# Patient Record
Sex: FEMALE | Race: WHITE | NOT HISPANIC OR LATINO | URBAN - METROPOLITAN AREA
[De-identification: names, ages, dates, MRNs, and addresses within clinical notes are randomized per-mention and may not be internally consistent; named-entity substitution may affect disease eponyms.]

---

## 2021-03-31 DIAGNOSIS — Z23 ENCOUNTER FOR IMMUNIZATION: ICD-10-CM

## 2021-04-06 ENCOUNTER — IMMUNIZATIONS (OUTPATIENT)
Dept: FAMILY MEDICINE CLINIC | Facility: HOSPITAL | Age: 61
End: 2021-04-06

## 2021-04-06 DIAGNOSIS — Z23 ENCOUNTER FOR IMMUNIZATION: Primary | ICD-10-CM

## 2021-04-06 PROCEDURE — 91300 SARS-COV-2 / COVID-19 MRNA VACCINE (PFIZER-BIONTECH) 30 MCG: CPT

## 2021-04-06 PROCEDURE — 0001A SARS-COV-2 / COVID-19 MRNA VACCINE (PFIZER-BIONTECH) 30 MCG: CPT

## 2021-04-27 ENCOUNTER — IMMUNIZATIONS (OUTPATIENT)
Dept: FAMILY MEDICINE CLINIC | Facility: HOSPITAL | Age: 61
End: 2021-04-27

## 2021-04-27 DIAGNOSIS — Z23 ENCOUNTER FOR IMMUNIZATION: Primary | ICD-10-CM

## 2021-04-27 PROCEDURE — 91300 SARS-COV-2 / COVID-19 MRNA VACCINE (PFIZER-BIONTECH) 30 MCG: CPT

## 2021-04-27 PROCEDURE — 0002A SARS-COV-2 / COVID-19 MRNA VACCINE (PFIZER-BIONTECH) 30 MCG: CPT

## 2021-12-11 ENCOUNTER — IMMUNIZATIONS (OUTPATIENT)
Dept: FAMILY MEDICINE CLINIC | Facility: HOSPITAL | Age: 61
End: 2021-12-11

## 2021-12-11 DIAGNOSIS — Z23 ENCOUNTER FOR IMMUNIZATION: Primary | ICD-10-CM

## 2021-12-11 PROCEDURE — 91300 COVID-19 PFIZER VACC 0.3 ML: CPT

## 2021-12-11 PROCEDURE — 0001A COVID-19 PFIZER VACC 0.3 ML: CPT

## 2022-04-29 ENCOUNTER — APPOINTMENT (OUTPATIENT)
Dept: LAB | Facility: MEDICAL CENTER | Age: 62
End: 2022-04-29

## 2023-02-25 ENCOUNTER — OFFICE VISIT (OUTPATIENT)
Dept: URGENT CARE | Age: 63
End: 2023-02-25

## 2023-02-25 VITALS
TEMPERATURE: 97 F | RESPIRATION RATE: 18 BRPM | SYSTOLIC BLOOD PRESSURE: 144 MMHG | OXYGEN SATURATION: 99 % | HEART RATE: 74 BPM | DIASTOLIC BLOOD PRESSURE: 76 MMHG

## 2023-02-25 DIAGNOSIS — H10.9 CONJUNCTIVITIS OF RIGHT EYE, UNSPECIFIED CONJUNCTIVITIS TYPE: Primary | ICD-10-CM

## 2023-02-25 RX ORDER — GENTAMICIN SULFATE 3 MG/ML
1 SOLUTION/ DROPS OPHTHALMIC 3 TIMES DAILY
Qty: 5 ML | Refills: 0 | Status: SHIPPED | OUTPATIENT
Start: 2023-02-25

## 2023-02-25 NOTE — PROGRESS NOTES
[facility]        NAME: Matheus Mena is a 58 y o  female  : 1960    MRN: [id number]  DATE: 2023  TIME: 10:48 AM    Assessment and Plan   Conjunctivitis of right eye, unspecified conjunctivitis type [H10 9]  1  Conjunctivitis of right eye, unspecified conjunctivitis type  gentamicin (GARAMYCIN) 0 3 % ophthalmic solution            Patient Instructions       Follow up with PCP in 3-5 days  Proceed to  ER if symptoms worsen  Chief Complaint     Chief Complaint   Patient presents with   • Eye Pain     Thursday a shoe box hit her right eye and she was having some discomfort- this am her eye was glued shut- vision right 20/25 left 20/20 with glasses         History of Present Illness       HPI   She reports she was opening a shoe box 2 days ago and it hit her in the right eye  She had some pain and watering  Discontinued yesterday  Today she woke up and it was glued shot  Also a bit of blurry vision    Review of Systems   Review of Systems   Constitutional: Negative for fever  Eyes: Positive for pain, discharge, redness and visual disturbance (blurry vision)  Gastrointestinal: Negative for diarrhea and vomiting  Neurological: Negative for headaches  Current Medications       Current Outpatient Medications:   •  gentamicin (GARAMYCIN) 0 3 % ophthalmic solution, Administer 1 drop to the right eye 3 (three) times a day X 5 days, Disp: 5 mL, Rfl: 0    Current Allergies     Allergies as of 2023 - Reviewed 2023   Allergen Reaction Noted   • Aspirin buf(eutan-khdpy-oxiaw) [ascriptin] Rash 2023   • Penicillins Rash 2012            The following portions of the patient's history were reviewed and updated as appropriate: allergies, current medications, past family history, past medical history, past social history, past surgical history and problem list      No past medical history on file  No past surgical history on file      No family history on file       Medications have been verified  Objective   /76 (BP Location: Right arm, Patient Position: Sitting, Cuff Size: Standard)   Pulse 74   Temp (!) 97 °F (36 1 °C)   Resp 18   SpO2 99%   No LMP recorded  Physical Exam     Physical Exam  HENT:      Right Ear: Tympanic membrane normal       Left Ear: Tympanic membrane normal       Nose: No rhinorrhea  Mouth/Throat:      Pharynx: Posterior oropharyngeal erythema (right eye) present  Eyes:      General:         Right eye: No discharge  Left eye: No discharge        Comments: Right conjunctival erythema, with mild swelling

## 2023-02-25 NOTE — LETTER
February 25, 2023     Patient: Jagruti Ha   YOB: 1960   Date of Visit: 2/25/2023       To Whom it May Concern:    Jud Max was seen in my clinic on 2/25/2023  She may return to work on 02/26/2023  If you have any questions or concerns, please don't hesitate to call           Sincerely,          APURVA Proctor        CC: No Recipients

## 2023-06-01 ENCOUNTER — OFFICE VISIT (OUTPATIENT)
Dept: FAMILY MEDICINE CLINIC | Facility: CLINIC | Age: 63
End: 2023-06-01

## 2023-06-01 VITALS
SYSTOLIC BLOOD PRESSURE: 110 MMHG | HEART RATE: 86 BPM | DIASTOLIC BLOOD PRESSURE: 70 MMHG | HEIGHT: 64 IN | OXYGEN SATURATION: 96 % | BODY MASS INDEX: 30.01 KG/M2 | TEMPERATURE: 97.5 F | WEIGHT: 175.8 LBS

## 2023-06-01 DIAGNOSIS — Z00.00 WELL ADULT EXAM: Primary | ICD-10-CM

## 2023-06-01 DIAGNOSIS — Z13.1 SCREENING FOR DIABETES MELLITUS: ICD-10-CM

## 2023-06-01 DIAGNOSIS — Z12.31 SCREENING MAMMOGRAM FOR BREAST CANCER: ICD-10-CM

## 2023-06-01 DIAGNOSIS — Z12.11 SCREEN FOR COLON CANCER: ICD-10-CM

## 2023-06-01 DIAGNOSIS — Z00.00 ANNUAL PHYSICAL EXAM: ICD-10-CM

## 2023-06-01 DIAGNOSIS — Z13.220 SCREENING FOR LIPID DISORDERS: ICD-10-CM

## 2023-06-01 DIAGNOSIS — Z12.31 ENCOUNTER FOR SCREENING MAMMOGRAM FOR MALIGNANT NEOPLASM OF BREAST: ICD-10-CM

## 2023-06-01 NOTE — PROGRESS NOTES
[facility]    NAME: Loly Peter  AGE: 61 y o  SEX: female  : 1960     DATE: 2023     Assessment and Plan:     Problem List Items Addressed This Visit    None  Visit Diagnoses     Well adult exam    -  Primary    Screening for lipid disorders        Screening for diabetes mellitus        Relevant Orders    Comprehensive metabolic panel    Encounter for screening mammogram for malignant neoplasm of breast        Screen for colon cancer        Relevant Orders    Cologuard    Screening mammogram for breast cancer        Relevant Orders    Mammo screening bilateral w 3d & cad    Annual physical exam              Immunizations and preventive care screenings were discussed with patient today  Appropriate education was printed on patient's after visit summary  Counseling:  · Exercise: the importance of regular exercise/physical activity was discussed  Recommend exercise 3-5 times per week for at least 30 minutes  BMI Counseling: Body mass index is 30 18 kg/m²  The BMI is above normal  Nutrition recommendations include decreasing portion sizes, encouraging healthy choices of fruits and vegetables, decreasing fast food intake, consuming healthier snacks, limiting drinks that contain sugar and moderation in carbohydrate intake  Exercise recommendations include exercising 3-5 times per week  Rationale for BMI follow-up plan is due to patient being overweight or obese  Depression Screening and Follow-up Plan: Patient was screened for depression during today's encounter  They screened negative with a PHQ-2 score of 0  Return in 1 year (on 2024)  Chief Complaint:     Chief Complaint   Patient presents with   • Annual Exam      History of Present Illness:     Adult Annual Physical   Patient here for a comprehensive physical exam  The patient reports no problems      Diet and Physical Activity  · Diet/Nutrition: frequent junk food  · Exercise: no formal exercise  Depression Screening  PHQ-2/9 Depression Screening    Little interest or pleasure in doing things: 0 - not at all  Feeling down, depressed, or hopeless: 0 - not at all  PHQ-2 Score: 0  PHQ-2 Interpretation: Negative depression screen       General Health  · Sleep: gets 7-8 hours of sleep on average and snores loudly  · Hearing: normal - bilateral   · Vision: most recent eye exam >1 year ago and wears glasses  · Dental: no dental visits for >1 year  /GYN Health  · Patient is: postmenopausal  · Last menstrual period:   · Contraceptive method: postmenopausal      Review of Systems:     Review of Systems   Constitutional: Negative for activity change and unexpected weight change  HENT: Negative for ear pain and sore throat  Eyes: Negative for visual disturbance  Respiratory: Negative for cough, shortness of breath and wheezing  Cardiovascular: Negative for chest pain and leg swelling  Gastrointestinal: Negative for abdominal pain, blood in stool, constipation, diarrhea, nausea and vomiting  Genitourinary: Positive for urgency  Negative for difficulty urinating  Musculoskeletal: Negative for arthralgias and myalgias  Skin: Negative for rash  Neurological: Negative for dizziness, syncope, light-headedness and headaches  Psychiatric/Behavioral: Negative for self-injury, sleep disturbance and suicidal ideas  The patient is not nervous/anxious  Past Medical History:     History reviewed  No pertinent past medical history     Past Surgical History:     Past Surgical History:   Procedure Laterality Date   •  SECTION     • TONSILECTOMY AND ADNOIDECTOMY Bilateral       Social History:     Social History     Socioeconomic History   • Marital status:      Spouse name: None   • Number of children: None   • Years of education: None   • Highest education level: None   Occupational History   • None   Tobacco Use   • "Smoking status: Former     Types: Cigarettes     Start date: 1976     Quit date: 2013     Years since quitting: 10 4   • Smokeless tobacco: Never   Substance and Sexual Activity   • Alcohol use: Yes     Comment: occ   • Drug use: Never   • Sexual activity: None   Other Topics Concern   • None   Social History Narrative   • None     Social Determinants of Health     Financial Resource Strain: Not on file   Food Insecurity: Not on file   Transportation Needs: Not on file   Physical Activity: Not on file   Stress: Not on file   Social Connections: Not on file   Intimate Partner Violence: Not on file   Housing Stability: Not on file      Family History:     Family History   Problem Relation Age of Onset   • Heart disease Mother    • Dementia Father    • Heart disease Father    • Hypertension Father    • Asthma Son    • Heart disease Maternal Grandmother       Current Medications:     No current outpatient medications on file  No current facility-administered medications for this visit  Allergies: Allergies   Allergen Reactions   • Aspirin Buf(Kaydz-Mahss-Eappf) [Ascriptin] Rash   • Penicillins Rash      Physical Exam:     /70 (BP Location: Left arm, Patient Position: Sitting, Cuff Size: Large)   Pulse 86   Temp 97 5 °F (36 4 °C) (Temporal)   Ht 5' 4\" (1 626 m)   Wt 79 7 kg (175 lb 12 8 oz)   SpO2 96%   BMI 30 18 kg/m²     Physical Exam  Vitals and nursing note reviewed  Constitutional:       General: She is not in acute distress  Appearance: Normal appearance  She is well-developed  She is not diaphoretic  HENT:      Head: Normocephalic and atraumatic  Right Ear: Tympanic membrane, ear canal and external ear normal       Left Ear: Tympanic membrane, ear canal and external ear normal       Nose: Nose normal    Eyes:      Conjunctiva/sclera: Conjunctivae normal       Pupils: Pupils are equal, round, and reactive to light  Neck:      Thyroid: No thyromegaly        Vascular: No carotid " bruit  Cardiovascular:      Rate and Rhythm: Normal rate and regular rhythm  Heart sounds: Normal heart sounds  No murmur heard  No friction rub  Pulmonary:      Effort: Pulmonary effort is normal  No respiratory distress  Breath sounds: Normal breath sounds  No wheezing  Abdominal:      General: Bowel sounds are normal  There is no distension  Palpations: Abdomen is soft  There is no mass  Tenderness: There is no abdominal tenderness  Musculoskeletal:      Right lower leg: No edema  Left lower leg: No edema  Lymphadenopathy:      Cervical: No cervical adenopathy  Skin:     General: Skin is warm and dry  Neurological:      General: No focal deficit present  Mental Status: She is alert and oriented to person, place, and time  Psychiatric:         Mood and Affect: Mood normal          Behavior: Behavior normal          Thought Content:  Thought content normal          Judgment: Judgment normal           EDUARDO Johnson

## 2023-06-15 ENCOUNTER — APPOINTMENT (OUTPATIENT)
Dept: LAB | Facility: MEDICAL CENTER | Age: 63
End: 2023-06-15
Payer: COMMERCIAL

## 2023-06-15 DIAGNOSIS — Z00.8 HEALTH EXAMINATION IN POPULATION SURVEYS: ICD-10-CM

## 2023-06-15 LAB
ALBUMIN SERPL BCP-MCNC: 3.8 G/DL (ref 3.5–5)
ALP SERPL-CCNC: 63 U/L (ref 46–116)
ALT SERPL W P-5'-P-CCNC: 28 U/L (ref 12–78)
ANION GAP SERPL CALCULATED.3IONS-SCNC: 1 MMOL/L (ref 4–13)
AST SERPL W P-5'-P-CCNC: 20 U/L (ref 5–45)
BILIRUB SERPL-MCNC: 0.39 MG/DL (ref 0.2–1)
BUN SERPL-MCNC: 14 MG/DL (ref 5–25)
CALCIUM SERPL-MCNC: 9.4 MG/DL (ref 8.3–10.1)
CHLORIDE SERPL-SCNC: 109 MMOL/L (ref 96–108)
CHOLEST SERPL-MCNC: 218 MG/DL
CO2 SERPL-SCNC: 28 MMOL/L (ref 21–32)
CREAT SERPL-MCNC: 0.72 MG/DL (ref 0.6–1.3)
GFR SERPL CREATININE-BSD FRML MDRD: 89 ML/MIN/1.73SQ M
GLUCOSE P FAST SERPL-MCNC: 86 MG/DL (ref 65–99)
HDLC SERPL-MCNC: 85 MG/DL
LDLC SERPL CALC-MCNC: 125 MG/DL (ref 0–100)
NONHDLC SERPL-MCNC: 133 MG/DL
POTASSIUM SERPL-SCNC: 4.5 MMOL/L (ref 3.5–5.3)
PROT SERPL-MCNC: 6.9 G/DL (ref 6.4–8.4)
SODIUM SERPL-SCNC: 138 MMOL/L (ref 135–147)
TRIGL SERPL-MCNC: 40 MG/DL

## 2023-06-15 PROCEDURE — 36415 COLL VENOUS BLD VENIPUNCTURE: CPT

## 2023-06-15 PROCEDURE — 80061 LIPID PANEL: CPT

## 2024-05-09 ENCOUNTER — TELEPHONE (OUTPATIENT)
Dept: FAMILY MEDICINE CLINIC | Facility: CLINIC | Age: 64
End: 2024-05-09

## 2024-05-09 NOTE — TELEPHONE ENCOUNTER
Pt had her annual physical scheduled with Steffanie on 6/3 at 3:40pm. Steffanie will be unavailable that day. Called pt to reschedule, KYLE.

## 2024-07-03 ENCOUNTER — OFFICE VISIT (OUTPATIENT)
Dept: FAMILY MEDICINE CLINIC | Facility: CLINIC | Age: 64
End: 2024-07-03
Payer: COMMERCIAL

## 2024-07-03 VITALS
OXYGEN SATURATION: 98 % | TEMPERATURE: 97.9 F | RESPIRATION RATE: 16 BRPM | DIASTOLIC BLOOD PRESSURE: 64 MMHG | BODY MASS INDEX: 29.06 KG/M2 | SYSTOLIC BLOOD PRESSURE: 90 MMHG | WEIGHT: 164 LBS | HEIGHT: 63 IN | HEART RATE: 87 BPM

## 2024-07-03 DIAGNOSIS — R53.83 FATIGUE, UNSPECIFIED TYPE: Primary | ICD-10-CM

## 2024-07-03 DIAGNOSIS — Z00.00 ANNUAL PHYSICAL EXAM: ICD-10-CM

## 2024-07-03 DIAGNOSIS — Z12.31 ENCOUNTER FOR SCREENING MAMMOGRAM FOR MALIGNANT NEOPLASM OF BREAST: ICD-10-CM

## 2024-07-03 DIAGNOSIS — Z13.220 SCREENING, LIPID: ICD-10-CM

## 2024-07-03 DIAGNOSIS — Z12.11 SCREEN FOR COLON CANCER: ICD-10-CM

## 2024-07-03 PROCEDURE — 99396 PREV VISIT EST AGE 40-64: CPT | Performed by: INTERNAL MEDICINE

## 2024-07-03 NOTE — ASSESSMENT & PLAN NOTE
Patient is only complaint is a little fatigue because she changed the shifts that she is working and she noticed nights.

## 2024-07-03 NOTE — PROGRESS NOTES
Adult Annual Physical  Name: Genesis Hanna      : 1960      MRN: [id number]  Encounter Provider: Melba Moreland MD  Encounter Date: 7/3/2024   Encounter department: [facility]    Assessment & Plan   1. Fatigue, unspecified type  -     Comprehensive metabolic panel; Future  2. Annual physical exam  Assessment & Plan:  Patient is only complaint is a little fatigue because she changed the shifts that she is working and she noticed nights.  3. Encounter for screening mammogram for malignant neoplasm of breast  -     Mammo screening bilateral w 3d & cad; Future  4. Screen for colon cancer  -     Occult Blood, Fecal Immunochemical; Future  -     Cologuard  5. Screening, lipid  -     Lipid panel; Future    Immunizations and preventive care screenings were discussed with patient today. Appropriate education was printed on patient's after visit summary.    Counseling:  Exercise: the importance of regular exercise/physical activity was discussed. Recommend exercise 3-5 times per week for at least 30 minutes.       Depression Screening and Follow-up Plan: Patient was screened for depression during today's encounter. They screened negative with a PHQ-2 score of 1.        History of Present Illness     Adult Annual Physical:  Patient presents for annual physical.     Diet and Physical Activity:  - Diet/Nutrition: well balanced diet.  - Exercise: walking.    Depression Screening:  - PHQ-2 Score: 1    General Health:  - Sleep: sleeps well.  - Hearing: normal hearing bilateral ears.  - Vision: most recent eye exam > 1 year ago.  - Dental: no dental visits for > 1 year.    /GYN Health:  - Follows with GYN: no.   - Menopause: postmenopausal.   - History of STDs: no  - Contraception: menopause.      Advanced Care Planning:  - Has an advanced directive?: no    - Has a durable medical POA?: no    - ACP document given to patient?: no      Review of Systems   Constitutional:  Positive for fatigue. Negative for  "chills and fever.   HENT:  Negative for ear pain and sore throat.    Eyes:  Negative for pain and visual disturbance.   Respiratory:  Negative for cough and shortness of breath.    Cardiovascular:  Negative for chest pain and palpitations.   Gastrointestinal:  Negative for abdominal pain, blood in stool and vomiting.   Genitourinary:  Negative for dysuria and hematuria.   Musculoskeletal:  Negative for arthralgias and back pain.   Skin:  Negative for color change and rash.   Allergic/Immunologic: Negative for immunocompromised state.   Neurological:  Negative for dizziness, seizures, syncope, weakness and light-headedness.   Hematological:  Does not bruise/bleed easily.   Psychiatric/Behavioral: Negative.     All other systems reviewed and are negative.        Objective     BP 90/64 (BP Location: Left arm, Patient Position: Sitting, Cuff Size: Large)   Pulse 87   Temp 97.9 °F (36.6 °C) (Temporal)   Resp 16   Ht 5' 3.25\" (1.607 m)   Wt 74.4 kg (164 lb)   SpO2 98%   BMI 28.82 kg/m²     Physical Exam  Vitals and nursing note reviewed.   Constitutional:       General: She is not in acute distress.     Appearance: Normal appearance. She is well-developed.   HENT:      Head: Normocephalic and atraumatic.      Nose: Nose normal. No congestion.      Mouth/Throat:      Mouth: Mucous membranes are moist.   Eyes:      Extraocular Movements: Extraocular movements intact.      Conjunctiva/sclera: Conjunctivae normal.      Pupils: Pupils are equal, round, and reactive to light.   Cardiovascular:      Rate and Rhythm: Normal rate and regular rhythm.      Heart sounds: No murmur heard.  Pulmonary:      Effort: Pulmonary effort is normal. No respiratory distress.      Breath sounds: Normal breath sounds.   Abdominal:      Palpations: Abdomen is soft. There is no mass.      Tenderness: There is no abdominal tenderness. There is no guarding.   Musculoskeletal:         General: No swelling.      Cervical back: Neck supple. "   Lymphadenopathy:      Cervical: No cervical adenopathy.   Skin:     General: Skin is warm and dry.      Capillary Refill: Capillary refill takes less than 2 seconds.   Neurological:      General: No focal deficit present.      Mental Status: She is alert and oriented to person, place, and time.      Cranial Nerves: No cranial nerve deficit.      Coordination: Coordination normal.      Gait: Gait normal.   Psychiatric:         Mood and Affect: Mood normal.         Behavior: Behavior normal.         Thought Content: Thought content normal.         Judgment: Judgment normal.       Administrative Statements

## 2024-08-16 ENCOUNTER — APPOINTMENT (OUTPATIENT)
Dept: LAB | Facility: CLINIC | Age: 64
End: 2024-08-16

## 2024-08-16 DIAGNOSIS — Z00.8 HEALTH EXAMINATION IN POPULATION SURVEY: ICD-10-CM

## 2024-08-16 LAB
CHOLEST SERPL-MCNC: 182 MG/DL
EST. AVERAGE GLUCOSE BLD GHB EST-MCNC: 100 MG/DL
HBA1C MFR BLD: 5.1 %
HDLC SERPL-MCNC: 69 MG/DL
LDLC SERPL CALC-MCNC: 104 MG/DL (ref 0–100)
NONHDLC SERPL-MCNC: 113 MG/DL
TRIGL SERPL-MCNC: 45 MG/DL

## 2024-08-16 PROCEDURE — 36415 COLL VENOUS BLD VENIPUNCTURE: CPT

## 2024-08-16 PROCEDURE — 80061 LIPID PANEL: CPT

## 2024-08-16 PROCEDURE — 83036 HEMOGLOBIN GLYCOSYLATED A1C: CPT

## 2025-02-17 ENCOUNTER — TELEPHONE (OUTPATIENT)
Dept: FAMILY MEDICINE CLINIC | Facility: CLINIC | Age: 65
End: 2025-02-17

## 2025-05-22 ENCOUNTER — TELEPHONE (OUTPATIENT)
Dept: FAMILY MEDICINE CLINIC | Facility: CLINIC | Age: 65
End: 2025-05-22

## 2025-07-10 ENCOUNTER — APPOINTMENT (OUTPATIENT)
Dept: RADIOLOGY | Facility: CLINIC | Age: 65
End: 2025-07-10
Payer: COMMERCIAL

## 2025-07-10 ENCOUNTER — OFFICE VISIT (OUTPATIENT)
Dept: URGENT CARE | Facility: CLINIC | Age: 65
End: 2025-07-10
Payer: COMMERCIAL

## 2025-07-10 VITALS
HEIGHT: 63 IN | SYSTOLIC BLOOD PRESSURE: 140 MMHG | TEMPERATURE: 97.8 F | OXYGEN SATURATION: 98 % | DIASTOLIC BLOOD PRESSURE: 82 MMHG | BODY MASS INDEX: 29.05 KG/M2 | RESPIRATION RATE: 18 BRPM

## 2025-07-10 DIAGNOSIS — S82.841A CLOSED BIMALLEOLAR FRACTURE OF RIGHT ANKLE, INITIAL ENCOUNTER: Primary | ICD-10-CM

## 2025-07-10 DIAGNOSIS — M25.571 ACUTE RIGHT ANKLE PAIN: ICD-10-CM

## 2025-07-10 PROCEDURE — 99214 OFFICE O/P EST MOD 30 MIN: CPT

## 2025-07-10 PROCEDURE — 73610 X-RAY EXAM OF ANKLE: CPT

## 2025-07-10 NOTE — LETTER
July 10, 2025     Patient: Genesis Hanna   YOB: 1960   Date of Visit: 7/10/2025       To Whom it May Concern:    Genesis Hanna was seen in my clinic on 7/10/2025. She may return to work on 7/14/2025.    If you have any questions or concerns, please don't hesitate to call.         Sincerely,          APURVA Kasper        CC: No Recipients

## 2025-07-10 NOTE — PATIENT INSTRUCTIONS
May take OTC Tylenol or Ibuprofen as needed for pain. Orthopedics referral placed.  Please follow-up for further evaluation. Please wear your CAM boot when walking/weight bearing unless your XR results are negative for a fracture.    Rest - avoid using the injured area and stop the activity that caused the injury  Ice - apply an ice pack or cold gel pack wrapped in a towel to the injured area for 20 minutes every 4 hours   Compression - wrap the injured area with an elastic bandage (ACE) to reduce swelling and provide support  Elevation - keep the injured area raised above the level of the heart to drain fluid and reduce swelling     If your symptoms do not improve with our current treatment plan or worsen, please schedule an appointment with your PCP. If you develop any high or persistent fevers, chest pain, palpitations, shortness of breath, difficulty swallowing, decreased urine output, abdominal pain, dizziness or any other concerning symptoms, please proceed to the ED.

## 2025-07-10 NOTE — ASSESSMENT & PLAN NOTE
Orders:  •  Ambulatory Referral to Orthopedic Surgery; Future  •  Cam Boot  •  Orthopedic injury treatment    Patient placed in a cam boot in office.  Patient neurovascularly intact s/p placement.  Orthopedics referral placed.  Patient instructed to follow-up with orthopedics for further evaluation and treatment.  Discussed RICE with patient.  Encouraged Tylenol/ibuprofen.  Offered naproxen, however patient declining at this time. Instructed patient to follow-up with PCP for no improvement or worsening of symptoms.  Patient educated on red flag symptoms and when to proceed to the ED.  Patient agreeable and understands current treatment plan.

## 2025-07-10 NOTE — PROGRESS NOTES
[facility]  Name: Genesis Hanna      : 1960      MRN: [id number]  Encounter Provider: APURVA Kasper  Encounter Date: 7/10/2025   Encounter department: [facility] [place]  :  Assessment & Plan  Acute right ankle pain    Orders:  •  XR ankle 3+ vw right; Future    XR imaging of the right ankle performed in office.  XR imaging reviewed and concerning for nondisplaced bimalleolar fracture. Pending final radiology read.  Closed bimalleolar fracture of right ankle, initial encounter    Orders:  •  Ambulatory Referral to Orthopedic Surgery; Future  •  Cam Boot  •  Orthopedic injury treatment    Patient placed in a cam boot in office.  Patient neurovascularly intact s/p placement.  Orthopedics referral placed.  Patient instructed to follow-up with orthopedics for further evaluation and treatment.  Discussed RICE with patient.  Encouraged Tylenol/ibuprofen.  Offered naproxen, however patient declining at this time. Instructed patient to follow-up with PCP for no improvement or worsening of symptoms.  Patient educated on red flag symptoms and when to proceed to the ED.  Patient agreeable and understands current treatment plan.       Patient Instructions  Follow up with PCP in 3-5 days.  Proceed to  ER if symptoms worsen.    If tests are performed, our office will contact you with results only if changes need to made to the care plan discussed with you at the visit. [facility]    Chief Complaint:   Chief Complaint   Patient presents with   • Ankle Pain     Patient c/o right ankle pain after overturning it walking out of bed this morning.       History of Present Illness {?Quick Links Encounters * My Last Note * Last Note in Specialty * Snapshot * Since Last Visit * History :41881}  65-year-old female presents to the clinic for evaluation of right ankle pain x 1 day.  Patient reports she was getting out of bed this morning to walk over to  the bathroom and she did not realize her right foot was asleep.  She reports when she stood up and went to walk forward her ankle twisted and rolled underneath her and she fell forward and caught herself before she landed on the ground.  Patient denied any head strike or loss of consciousness.  She reports she had immediate pain which has been persistent throughout the day.  She states the pain is a 3 out of 10 at rest and a 8 out of 10 with walking and weightbearing.  She reports the pain is both dull and aching and sharp and shooting in nature.  She reports the lateral ankle hurts her more than the medial ankle and she does have some tingling to the back of her leg.  She also reports some swelling and bruising.  Denies any pain in her foot or toes.  She denies taking any OTC medications today and has not applied any ice to the affected areas.      Ankle Pain   Pertinent negatives include no numbness.         Review of Systems   Constitutional:  Negative for chills and fever.   Respiratory:  Negative for shortness of breath.    Cardiovascular:  Negative for chest pain and palpitations.   Musculoskeletal:  Positive for arthralgias (right ankle) and joint swelling (right ankle).   Skin:  Positive for color change (bruising).   Neurological:  Negative for weakness, numbness and headaches.        Tingling to right ankle   All other systems reviewed and are negative.    Past Medical History   Past Medical History[1]  Past Surgical History[2]  Family History[3]  she reports that she quit smoking about 12 years ago. Her smoking use included cigarettes. She started smoking about 49 years ago. She has a 9.3 pack-year smoking history. She has never used smokeless tobacco. She reports current alcohol use. She reports current drug use. Drug: Marijuana.  No current outpatient medicationsAllergies[4]     Objective {?Quick Links Trend Vitals * Enter New Vitals * Results Review * Timeline (Adult) * Labs * Imaging * Cardiology *  "Procedures * Lung Cancer Screening * Surgical eConsent :48128}  /82 Comment: manual  Temp 97.8 °F (36.6 °C) (Temporal)   Resp 18   Ht 5' 3\" (1.6 m)   SpO2 98%   BMI 29.05 kg/m²      Physical Exam  Vitals and nursing note reviewed.   Constitutional:       Appearance: Normal appearance.   HENT:      Head: Normocephalic and atraumatic.     Cardiovascular:      Rate and Rhythm: Normal rate and regular rhythm.      Pulses: Normal pulses.      Heart sounds: Normal heart sounds.   Pulmonary:      Effort: Pulmonary effort is normal.      Breath sounds: Normal breath sounds.     Musculoskeletal:      Right ankle: Swelling and ecchymosis present. No deformity or lacerations. Tenderness present over the lateral malleolus and medial malleolus. Decreased range of motion. Normal pulse.      Left ankle: Normal.      Right foot: Normal.      Left foot: Normal.     Skin:     Capillary Refill: Capillary refill takes less than 2 seconds.      Findings: Bruising present.     Neurological:      General: No focal deficit present.      Mental Status: She is alert and oriented to person, place, and time.     Psychiatric:         Mood and Affect: Mood normal.         Behavior: Behavior normal.         Orthopedic injury treatment    Date/Time: 7/10/2025 1:00 PM    Performed by: APURVA Kasper  Authorized by: APURVA Kasper    Patient Location:  [facility] Protocol:  Procedure performed by: (ANU Carrasco)  Consent: Verbal consent obtained  Risks and benefits: risks, benefits and alternatives were discussed  Consent given by: patient  Patient understanding: patient states understanding of the procedure being performed  Patient consent: the patient's understanding of the procedure matches consent given  Required items: required blood products, implants, devices, and special equipment available  Patient identity confirmed: verbally with patient    Injury location:  Ankle  Location details:  Right ankle  Injury " "type:  Fracture  Fracture type: bimalleolar    Neurovascular status: Neurovascularly intact    Distal perfusion: normal    Neurological function: normal    Range of motion: reduced    Local anesthesia used?: No    General anesthesia used?: No    Manipulation performed?: No    Immobilization:  Other (comment) (CAM boot)  Neurovascular status: Neurovascularly intact    Distal perfusion: normal    Neurological function: normal    Range of motion: normal    Range of motion comment:  Reduced  Patient tolerance:  Patient tolerated the procedure well with no immediate complications   Patient reports improvement of symptoms in CAM boot        Portions of the record may have been created with voice recognition software.  Occasional wrong word or \"sound a like\" substitutions may have occurred due to the inherent limitations of voice recognition software.  Read the chart carefully and recognize, using context, where substitutions have occurred.           [1]  No past medical history on file.[2]  Past Surgical History:  Procedure Laterality Date   •  SECTION     • TONSILECTOMY AND ADNOIDECTOMY Bilateral    [3]  Family History  Problem Relation Name Age of Onset   • Heart disease Mother     • Dementia Father     • Heart disease Father     • Hypertension Father     • Asthma Son     • Heart disease Maternal Grandmother     [4]  Allergies  Allergen Reactions   • Aspirin Buf(Peksi-Avydz-Etoul) [Ascriptin] Rash   • Penicillins Rash   "

## 2025-07-14 ENCOUNTER — OFFICE VISIT (OUTPATIENT)
Dept: OBGYN CLINIC | Facility: CLINIC | Age: 65
End: 2025-07-14
Payer: COMMERCIAL

## 2025-07-14 ENCOUNTER — APPOINTMENT (OUTPATIENT)
Dept: LAB | Facility: CLINIC | Age: 65
End: 2025-07-14
Attending: PREVENTIVE MEDICINE

## 2025-07-14 VITALS — BODY MASS INDEX: 27.82 KG/M2 | WEIGHT: 157 LBS | HEIGHT: 63 IN

## 2025-07-14 DIAGNOSIS — Z00.8 ENCOUNTER FOR OTHER GENERAL EXAMINATION: ICD-10-CM

## 2025-07-14 DIAGNOSIS — Z00.8 HEALTH EXAMINATION IN POPULATION SURVEY: ICD-10-CM

## 2025-07-14 DIAGNOSIS — S93.409A SPRAIN OF ANKLE, INITIAL ENCOUNTER: Primary | ICD-10-CM

## 2025-07-14 DIAGNOSIS — S82.61XA CLOSED AVULSION FRACTURE OF LATERAL MALLEOLUS OF RIGHT FIBULA, INITIAL ENCOUNTER: ICD-10-CM

## 2025-07-14 LAB
CHOLEST SERPL-MCNC: 202 MG/DL (ref ?–200)
EST. AVERAGE GLUCOSE BLD GHB EST-MCNC: 100 MG/DL
HBA1C MFR BLD: 5.1 %
HDLC SERPL-MCNC: 64 MG/DL
LDLC SERPL CALC-MCNC: 125 MG/DL (ref 0–100)
NONHDLC SERPL-MCNC: 138 MG/DL
TRIGL SERPL-MCNC: 65 MG/DL (ref ?–150)

## 2025-07-14 PROCEDURE — 99204 OFFICE O/P NEW MOD 45 MIN: CPT | Performed by: STUDENT IN AN ORGANIZED HEALTH CARE EDUCATION/TRAINING PROGRAM

## 2025-07-14 PROCEDURE — 83036 HEMOGLOBIN GLYCOSYLATED A1C: CPT

## 2025-07-14 PROCEDURE — 80061 LIPID PANEL: CPT

## 2025-07-14 PROCEDURE — 36415 COLL VENOUS BLD VENIPUNCTURE: CPT

## 2025-07-14 RX ORDER — MELOXICAM 15 MG/1
15 TABLET ORAL DAILY
Qty: 28 TABLET | Refills: 0 | Status: SHIPPED | OUTPATIENT
Start: 2025-07-14 | End: 2025-07-14

## 2025-07-14 NOTE — PROGRESS NOTES
[facility]th[facility] Clinic Visit       Assessment & Plan  Sprain of ankle, initial encounter    Orders:    Ambulatory Referral to Orthopedic Surgery    Closed avulsion fracture of lateral malleolus of right fibula, initial encounter    Orders:    Ambulatory Referral to Orthopedic Surgery      I reviewed history, exam, imaging with the patient in clinic today.  I did review the patient's radiographs that show a small avulsion fracture of the lateral malleolus and a chronic avulsion fracture of the medial malleolus.  There does not appear to be any acute fracture.  On exam, she does have tenderness over the ATFL and CFL.  She has swelling over the lateral malleolus.  The patient has been weightbearing in a cam boot.  She states that her symptoms are improving.  She is not taking her medications today.  I discussed with the patient this can be treated nonoperatively.  I discussed that her symptoms are most consistent with ankle sprain versus small avulsion fracture.  I discussed that we treat this with a cam boot weightbearing.  I recommended elevation and ice to help with swelling.  We did discuss anti-inflammatories but the patient has had a reaction to it in the past.  Therefore we instructed her to hold off on any anti-inflammatories.  Patient can take Tylenol for pain control.  I instructed the patient to follow-up in 2 weeks for repeat evaluation.  Patient should remain out of work until her follow-up appointment. The patient demonstrated understanding of the discussion and was in agreement with the plan.  All of the questions were answered.  Patient can reach out to clinic with any questions or concerns at any time.    Follow up: 2 weeks  Imaging: none      Chief Complaint   Patient presents with    Right Ankle - Pain       History of Present Illness:  The patient is a 65 y.o. female seen in clinic for right ankle pain.  Patient states that the pain started 4 days ago on 7/10/2025.  She states that she got out  of bed and rolled her ankle.  She was able to walk on it afterwards but did note significant pain.  She noted subsequent swelling and pain over the lateral aspect of the ankle.  She go to urgent care where she provided a cam boot.  States that wearing it provides significant relief.  She has not been taking any meds.    DOI: 7/10/2025  Occupation: housekeeping [employer]    Ankle Surgical History:  None      Past Medical, Social and Family History:  Past Medical History[1]  Past Surgical History[2]  Allergies[3]  Medications Ordered Prior to Encounter[4]  Social History     Socioeconomic History    Marital status:      Spouse name: Not on file    Number of children: Not on file    Years of education: Not on file    Highest education level: Not on file   Occupational History    Not on file   Tobacco Use    Smoking status: Former     Current packs/day: 0.00     Average packs/day: 0.3 packs/day for 37.0 years (9.3 ttl pk-yrs)     Types: Cigarettes     Start date:      Quit date:      Years since quittin.5    Smokeless tobacco: Never   Vaping Use    Vaping status: Never Used   Substance and Sexual Activity    Alcohol use: Yes     Comment: occ    Drug use: Yes     Types: Marijuana    Sexual activity: Not on file   Other Topics Concern    Not on file   Social History Narrative    Not on file     Social Drivers of Health     Financial Resource Strain: Not on file   Food Insecurity: Not on file   Transportation Needs: Not on file   Physical Activity: Not on file   Stress: Not on file   Social Connections: Not on file   Intimate Partner Violence: Not on file   Housing Stability: Not on file         I have reviewed the past medical, surgical, social and family history, medications and allergies as documented in the EMR.      Physical Exam:    Focused right Ankle Exam:    Inspection:  - Skin: intact  - Ecchymosis: no  - Erythema: no  - Effusion: no  - Swelling: yes, lateral    Tender to Palpation:  - Medial  malleolus: no  - Lateral malleolus: yes  - Calcaneus: no  - Metatarsals: no  - Base of 5th metatarsal: no  - Deltoid ligament: no  - ATFL: yes  - CFL: no  - Fibular head: no  - Talar dome: Negative    Range of Motion:  - Ankle DF: full, painless  - Ankle PF: full, painless  - Inversion: full, with mild pain  - Eversion: full, painless    Strength Testing:  - Ankle DF: 5/5  - Ankle PF: 5/5  - Inversion: 5/5  - Eversion: 5/5  - Extension of great toe: 5/5    Peroneal tendons:  - Subluxation: no  - Tenderness: no    Syndesmosis:  - Squeeze test: negative  - External rotation stress test: negative    Ligaments:  - Anterior drawer: negative  - Tilt test: negative    Achilles:  - Palpable gap: no  - Tenderness: no  - Corbin test: negative    Fracture:  - Metatarsal squeeze test: negative    No pain with palpation or range of motion of midfoot and forefoot bilaterally  Range of motion testing of the hip and knee are within normal limits.  No calf tenderness to palpation     LE NV Exam:   +2 DP/PT pulses   Foot is warm and well perfused  SILT L2-S1, SILT SPN/DPN/T/saphenous/sural nerve distributions  SPN/DPN/TA motor intact      Ankle Imaging:    X-rays of the right ankle were obtained 2025 and reviewed with the patient.  Based on my independent review, imaging shows a small avulsion fracture of the lateral malleolus.  There does appear to be a chronic avulsion fracture on the medial malleolus.  No acute fracture noted..      Procedures:  None      This note was written with the use of dictation software. Please excuse any errors.      Scribe Attestation      I,:   am acting as a scribe while in the presence of the attending physician.:       I,:   personally performed the services described in this documentation    as scribed in my presence.:                [1] No past medical history on file.  [2]   Past Surgical History:  Procedure Laterality Date     SECTION      TONSILECTOMY AND ADNOIDECTOMY Bilateral     [3]   Allergies  Allergen Reactions    Aspirin Buf([id number]) [Ascriptin] Rash    Penicillins Rash   [4]   No current outpatient medications on file prior to visit.     No current facility-administered medications on file prior to visit.

## 2025-07-14 NOTE — LETTER
July 14, 2025     Patient: Genesis Hanna  YOB: 1960  Date of Visit: 7/14/2025      To Whom it May Concern:    Genesis Hanna is under my professional care. Genesis was seen in my office on 7/14/2025. Genesis is not cleared to return to work. She will be re-evaluated in 2 weeks.    If you have any questions or concerns, please don't hesitate to call.         Sincerely,          Rohit Tipton MD        CC: No Recipients

## 2025-07-28 ENCOUNTER — OFFICE VISIT (OUTPATIENT)
Dept: OBGYN CLINIC | Facility: CLINIC | Age: 65
End: 2025-07-28
Payer: COMMERCIAL

## 2025-07-28 VITALS — BODY MASS INDEX: 27.82 KG/M2 | HEIGHT: 63 IN | WEIGHT: 157 LBS

## 2025-07-28 DIAGNOSIS — S93.409A SPRAIN OF ANKLE, INITIAL ENCOUNTER: ICD-10-CM

## 2025-07-28 DIAGNOSIS — S82.61XA CLOSED AVULSION FRACTURE OF LATERAL MALLEOLUS OF RIGHT FIBULA, INITIAL ENCOUNTER: Primary | ICD-10-CM

## 2025-07-28 PROCEDURE — 99213 OFFICE O/P EST LOW 20 MIN: CPT | Performed by: STUDENT IN AN ORGANIZED HEALTH CARE EDUCATION/TRAINING PROGRAM
